# Patient Record
Sex: FEMALE | Race: WHITE | NOT HISPANIC OR LATINO | Employment: FULL TIME | URBAN - NONMETROPOLITAN AREA
[De-identification: names, ages, dates, MRNs, and addresses within clinical notes are randomized per-mention and may not be internally consistent; named-entity substitution may affect disease eponyms.]

---

## 2024-03-13 PROBLEM — R11.0 NAUSEA IN ADULT: Status: ACTIVE | Noted: 2024-03-13

## 2024-03-13 PROBLEM — F31.81 BIPOLAR II DISORDER (MULTI): Status: ACTIVE | Noted: 2022-04-15

## 2024-03-13 PROBLEM — R05.9 COUGH: Status: ACTIVE | Noted: 2024-03-13

## 2024-03-13 PROBLEM — E87.6 HYPOKALEMIA: Status: ACTIVE | Noted: 2024-03-13

## 2024-03-13 PROBLEM — R47.02: Status: ACTIVE | Noted: 2024-03-13

## 2024-03-13 PROBLEM — O14.90 PRE-ECLAMPSIA (HHS-HCC): Status: ACTIVE | Noted: 2017-10-18

## 2024-03-13 PROBLEM — M67.40 GANGLION CYST: Status: ACTIVE | Noted: 2024-03-13

## 2024-03-13 PROBLEM — F31.61 BIPOLAR DISORDER, CURRENT EPISODE MIXED, MILD (MULTI): Status: ACTIVE | Noted: 2020-06-25

## 2024-03-13 PROBLEM — F41.9 ANXIETY: Status: ACTIVE | Noted: 2024-03-13

## 2024-03-13 PROBLEM — F90.9 ATTENTION DEFICIT DISORDER OF ADULT WITH HYPERACTIVITY: Status: ACTIVE | Noted: 2020-11-13

## 2024-03-13 PROBLEM — K52.9 GASTROENTERITIS: Status: ACTIVE | Noted: 2024-03-13

## 2024-03-13 PROBLEM — N89.8 REDUNDANT TISSUE OF HYMENAL RING: Status: ACTIVE | Noted: 2017-03-08

## 2024-03-13 PROBLEM — M72.2 PLANTAR FASCIITIS, RIGHT: Status: ACTIVE | Noted: 2024-03-13

## 2024-03-13 PROBLEM — H92.01 RIGHT EAR PAIN: Status: ACTIVE | Noted: 2024-03-13

## 2024-03-13 PROBLEM — R93.89 ABNORMAL CHEST X-RAY: Status: ACTIVE | Noted: 2024-03-13

## 2024-03-13 PROBLEM — G56.21 ULNAR NEUROPATHY AT ELBOW OF RIGHT UPPER EXTREMITY: Status: ACTIVE | Noted: 2024-03-13

## 2024-03-13 PROBLEM — R06.00 ACUTE DYSPNEA: Status: ACTIVE | Noted: 2024-03-13

## 2024-03-13 PROBLEM — R31.9 HEMATURIA: Status: ACTIVE | Noted: 2024-03-13

## 2024-03-13 PROBLEM — L03.90 CELLULITIS: Status: ACTIVE | Noted: 2024-03-13

## 2024-03-13 PROBLEM — R30.0 DYSURIA: Status: ACTIVE | Noted: 2024-03-13

## 2024-03-13 PROBLEM — M25.579 ANKLE PAIN: Status: ACTIVE | Noted: 2024-03-13

## 2024-03-13 PROBLEM — J02.9 PHARYNGITIS: Status: ACTIVE | Noted: 2024-03-13

## 2024-03-13 PROBLEM — M62.89 MASS OF MUSCLE OF RIGHT UPPER EXTREMITY: Status: ACTIVE | Noted: 2024-03-13

## 2024-03-13 PROBLEM — G56.00 CARPAL TUNNEL SYNDROME: Status: ACTIVE | Noted: 2024-03-13

## 2024-03-13 PROBLEM — D72.820 ATYPICAL LYMPHOCYTOSIS: Status: ACTIVE | Noted: 2024-03-13

## 2024-03-13 PROBLEM — M26.629 TMJ SYNDROME: Status: ACTIVE | Noted: 2024-03-13

## 2024-03-13 PROBLEM — R13.10 DYSPHAGIA: Status: ACTIVE | Noted: 2024-03-13

## 2024-03-13 PROBLEM — E66.9 OBESITY: Status: ACTIVE | Noted: 2024-03-13

## 2024-03-13 PROBLEM — R00.2 PALPITATIONS: Status: ACTIVE | Noted: 2024-03-13

## 2024-03-13 PROBLEM — F41.0 PANIC ATTACK: Status: ACTIVE | Noted: 2024-03-13

## 2024-03-13 PROBLEM — E07.89 THYROID FULLNESS: Status: ACTIVE | Noted: 2024-03-13

## 2024-03-13 PROBLEM — M70.40 BURSITIS, PREPATELLAR: Status: ACTIVE | Noted: 2024-03-13

## 2024-03-13 PROBLEM — R10.10 PAIN OF UPPER ABDOMEN: Status: ACTIVE | Noted: 2024-03-13

## 2024-03-13 PROBLEM — R10.13 DYSPEPSIA: Status: ACTIVE | Noted: 2024-03-13

## 2024-03-13 PROBLEM — R19.7 DIARRHEA: Status: ACTIVE | Noted: 2024-03-13

## 2024-03-13 PROBLEM — M79.671 RIGHT FOOT PAIN: Status: ACTIVE | Noted: 2024-03-13

## 2024-03-13 PROBLEM — K21.9 GERD (GASTROESOPHAGEAL REFLUX DISEASE): Status: ACTIVE | Noted: 2024-03-13

## 2024-03-13 PROBLEM — F32.A DEPRESSION: Status: ACTIVE | Noted: 2024-03-13

## 2024-03-13 PROBLEM — F90.0 ADHD, PREDOMINANTLY INATTENTIVE TYPE: Status: ACTIVE | Noted: 2020-10-15

## 2024-03-13 PROBLEM — H00.019 STYE: Status: ACTIVE | Noted: 2024-03-13

## 2024-03-13 PROBLEM — F41.1 GENERALIZED ANXIETY DISORDER: Status: ACTIVE | Noted: 2020-06-25

## 2024-03-13 PROBLEM — R35.0 FREQUENCY OF URINATION: Status: ACTIVE | Noted: 2024-03-13

## 2024-03-13 PROBLEM — M25.561 RIGHT KNEE PAIN: Status: ACTIVE | Noted: 2024-03-13

## 2024-03-13 PROBLEM — G50.0 TRIGEMINAL NEURALGIA: Status: ACTIVE | Noted: 2024-03-13

## 2025-06-23 NOTE — PROGRESS NOTES
Subjective        Brigitte Damon is a 34 y.o. female who presents for      HPI:    Dr Cruz pt       Chief Complaint   Patient presents with    Weight Loss      Dropped  in year  7 pant sizes  does not know start weight        What concern/ problem/pain/symptom  brings you here today? Lost weight - very stressed so feels that played a part - was eating much less due to her stress     Stress due - marriage issues     One year ago- stress started - initially sstarted to try to lose weight last June and was eating better and working out but then seemed to get out of hand and stopped working out but still losing weight     Still losing weight     Feels tired       Fam history - hemachromatosis      Fam history- none for thyroid       No history cig smoking    Smokes MJ- smokes daily- three times daily- 3 years       how long has pt had sxs?past several months       describe symptoms-  Fever-no  Cough-no  Nausea- yes  Vomiting-occasional  Blood in urine- no  Blood in stool-no  Pain- no      how often do symptoms occur?  daily    has pt tried anything for current symptoms, including medications (OTC or prescription)  ?   thc      what makes symptoms worse?      has pt been seen recently for this problem ( within past 2-3 weeks) ?no    if yes- where?    by who?    what treatment was provided?      Pap and HPV done 2023 - Dr Ibrahim       Wt Readings from Last 5 Encounters:   06/24/25 60.9 kg (134 lb 4.8 oz)   10/05/22 78.1 kg (172 lb 3.2 oz)   05/26/22 78.7 kg (173 lb 9 oz)   07/07/21 74.8 kg (165 lb)   12/07/20 79.4 kg (175 lb)        Daughter- 11 years old           Tobacco Use History[1]      Social History     Substance and Sexual Activity   Alcohol Use Not Currently          Review of Systems:    Review of Systems    Objective        Vitals:    06/24/25 1246   BP: 109/64   BP Location: Left arm   Patient Position: Sitting   BP Cuff Size: Adult   Pulse: 101   Resp: 20   Temp: 36.2 °C (97.2 °F)   TempSrc: Temporal  "  SpO2: 97%   Weight: 60.9 kg (134 lb 4.8 oz)   Height: 1.549 m (5' 1\")       Pt is A and O x3, NAD  Head- normocephalic and atraumatic,   EYES- conjunctiva and lids- normal  EARS/NOSE- TM's normal, nasopharynx- normal and atraumatic  OROPHARYNX- normal  NECK- supple, FROM  THYROID- NT, normal size, no nodule noted  LYMPH- no cervical lymph nodes palpated   BREAST- bilateral breast NT, no nodules, symmetric, no nipple discharge  CV- RRR without murmur  PULM- CTA bilaterally, normal respiratory effort  RESPIRATORY EFFORT- normal , no retractions or nasal flaring   ABD- normoactive BS's , soft , NT, no hepatosplenomegaly palpated  PELVIC-NT, no masses, normal exam  EXT- no edema,NT  SKIN- no abnormal skin lesions noted  NEURO- no focal deficits  PSYCH- pleasant, normal judgement and insight                   BP Readings from Last 3 Encounters:   06/24/25 109/64   10/05/22 146/77   05/26/22 106/73           Wt Readings from Last 3 Encounters:   06/24/25 60.9 kg (134 lb 4.8 oz)   10/05/22 78.1 kg (172 lb 3.2 oz)   05/26/22 78.7 kg (173 lb 9 oz)         BMI Readings from Last 3 Encounters:   06/24/25 25.38 kg/m²   10/05/22 33.63 kg/m²   05/26/22 33.90 kg/m²           Assessment & Plan  Weight loss, unintentional    Orders:    Basic Metabolic Panel; Future    CBC; Future    Hepatic Function Panel; Future    Thyroid Stimulating Hormone; Future    Vitamin B12; Future    Vitamin D 25-Hydroxy,Total (for eval of Vitamin D levels); Future    XR chest 2 views; Future    Other fatigue         Smoker    Orders:    XR chest 2 views; Future    Encounter for screening mammogram for malignant neoplasm of breast    Orders:    BI mammo bilateral screening tomosynthesis; Future             Follow up wioth PCP Dr Cruz                        [1]   Social History  Tobacco Use   Smoking Status Never    Passive exposure: Past   Smokeless Tobacco Never     "

## 2025-06-24 ENCOUNTER — OFFICE VISIT (OUTPATIENT)
Dept: PRIMARY CARE | Facility: CLINIC | Age: 35
End: 2025-06-24
Payer: COMMERCIAL

## 2025-06-24 VITALS
WEIGHT: 134.3 LBS | TEMPERATURE: 97.2 F | BODY MASS INDEX: 25.36 KG/M2 | SYSTOLIC BLOOD PRESSURE: 109 MMHG | HEIGHT: 61 IN | HEART RATE: 101 BPM | DIASTOLIC BLOOD PRESSURE: 64 MMHG | RESPIRATION RATE: 20 BRPM | OXYGEN SATURATION: 97 %

## 2025-06-24 DIAGNOSIS — R63.4 WEIGHT LOSS, UNINTENTIONAL: Primary | ICD-10-CM

## 2025-06-24 DIAGNOSIS — F17.200 SMOKER: ICD-10-CM

## 2025-06-24 DIAGNOSIS — Z12.31 ENCOUNTER FOR SCREENING MAMMOGRAM FOR MALIGNANT NEOPLASM OF BREAST: ICD-10-CM

## 2025-06-24 DIAGNOSIS — R53.83 OTHER FATIGUE: ICD-10-CM

## 2025-06-24 PROCEDURE — 3078F DIAST BP <80 MM HG: CPT | Performed by: FAMILY MEDICINE

## 2025-06-24 PROCEDURE — 99214 OFFICE O/P EST MOD 30 MIN: CPT | Performed by: FAMILY MEDICINE

## 2025-06-24 PROCEDURE — 3074F SYST BP LT 130 MM HG: CPT | Performed by: FAMILY MEDICINE

## 2025-06-24 PROCEDURE — 1036F TOBACCO NON-USER: CPT | Performed by: FAMILY MEDICINE

## 2025-06-24 PROCEDURE — 3008F BODY MASS INDEX DOCD: CPT | Performed by: FAMILY MEDICINE

## 2025-06-25 ENCOUNTER — HOSPITAL ENCOUNTER (OUTPATIENT)
Dept: RADIOLOGY | Facility: CLINIC | Age: 35
Discharge: HOME | End: 2025-06-25
Payer: COMMERCIAL

## 2025-06-25 DIAGNOSIS — R63.4 WEIGHT LOSS, UNINTENTIONAL: ICD-10-CM

## 2025-06-25 DIAGNOSIS — Z12.31 ENCOUNTER FOR SCREENING MAMMOGRAM FOR MALIGNANT NEOPLASM OF BREAST: ICD-10-CM

## 2025-06-25 DIAGNOSIS — F17.200 SMOKER: ICD-10-CM

## 2025-06-25 LAB
25(OH)D3+25(OH)D2 SERPL-MCNC: 28 NG/ML (ref 30–100)
ALBUMIN SERPL-MCNC: 4.6 G/DL (ref 3.6–5.1)
ALBUMIN/GLOB SERPL: 2.3 (CALC) (ref 1–2.5)
ALP SERPL-CCNC: 35 U/L (ref 31–125)
ALT SERPL-CCNC: 11 U/L (ref 6–29)
ANION GAP SERPL CALCULATED.4IONS-SCNC: 11 MMOL/L (CALC) (ref 7–17)
AST SERPL-CCNC: 12 U/L (ref 10–30)
BILIRUB DIRECT SERPL-MCNC: 0.1 MG/DL
BILIRUB INDIRECT SERPL-MCNC: 0.4 MG/DL (CALC) (ref 0.2–1.2)
BILIRUB SERPL-MCNC: 0.5 MG/DL (ref 0.2–1.2)
BUN SERPL-MCNC: 13 MG/DL (ref 7–25)
BUN/CREAT SERPL: ABNORMAL (CALC) (ref 6–22)
CALCIUM SERPL-MCNC: 9.3 MG/DL (ref 8.6–10.2)
CHLORIDE SERPL-SCNC: 108 MMOL/L (ref 98–110)
CO2 SERPL-SCNC: 19 MMOL/L (ref 20–32)
CREAT SERPL-MCNC: 0.73 MG/DL (ref 0.5–0.97)
EGFRCR SERPLBLD CKD-EPI 2021: 109 ML/MIN/1.73M2
ERYTHROCYTE [DISTWIDTH] IN BLOOD BY AUTOMATED COUNT: 12.8 % (ref 11–15)
GLOBULIN SER CALC-MCNC: 2 G/DL (CALC) (ref 1.9–3.7)
GLUCOSE SERPL-MCNC: 84 MG/DL (ref 65–99)
HCT VFR BLD AUTO: 40.7 % (ref 35–45)
HGB BLD-MCNC: 12.9 G/DL (ref 11.7–15.5)
MCH RBC QN AUTO: 29.8 PG (ref 27–33)
MCHC RBC AUTO-ENTMCNC: 31.7 G/DL (ref 32–36)
MCV RBC AUTO: 94 FL (ref 80–100)
PLATELET # BLD AUTO: 277 THOUSAND/UL (ref 140–400)
PMV BLD REES-ECKER: 10.9 FL (ref 7.5–12.5)
POTASSIUM SERPL-SCNC: 4.3 MMOL/L (ref 3.5–5.3)
PROT SERPL-MCNC: 6.6 G/DL (ref 6.1–8.1)
RBC # BLD AUTO: 4.33 MILLION/UL (ref 3.8–5.1)
SODIUM SERPL-SCNC: 138 MMOL/L (ref 135–146)
TSH SERPL-ACNC: 0.52 MIU/L
VIT B12 SERPL-MCNC: 476 PG/ML (ref 200–1100)
WBC # BLD AUTO: 9.7 THOUSAND/UL (ref 3.8–10.8)

## 2025-06-25 PROCEDURE — 77067 SCR MAMMO BI INCL CAD: CPT | Performed by: RADIOLOGY

## 2025-06-25 PROCEDURE — 71046 X-RAY EXAM CHEST 2 VIEWS: CPT | Performed by: RADIOLOGY

## 2025-06-25 PROCEDURE — 71046 X-RAY EXAM CHEST 2 VIEWS: CPT

## 2025-06-25 PROCEDURE — 77063 BREAST TOMOSYNTHESIS BI: CPT | Performed by: RADIOLOGY

## 2025-06-25 PROCEDURE — 77067 SCR MAMMO BI INCL CAD: CPT

## 2025-07-01 DIAGNOSIS — R63.4 WEIGHT LOSS, UNINTENTIONAL: ICD-10-CM

## 2025-08-05 ENCOUNTER — APPOINTMENT (OUTPATIENT)
Dept: PRIMARY CARE | Facility: CLINIC | Age: 35
End: 2025-08-05
Payer: COMMERCIAL

## 2025-08-05 VITALS
WEIGHT: 139.3 LBS | OXYGEN SATURATION: 98 % | TEMPERATURE: 97.3 F | HEART RATE: 91 BPM | DIASTOLIC BLOOD PRESSURE: 69 MMHG | HEIGHT: 61 IN | BODY MASS INDEX: 26.3 KG/M2 | SYSTOLIC BLOOD PRESSURE: 127 MMHG

## 2025-08-05 DIAGNOSIS — M25.50 ARTHRALGIA OF MULTIPLE SITES: ICD-10-CM

## 2025-08-05 DIAGNOSIS — E55.9 VITAMIN D DEFICIENCY: Primary | ICD-10-CM

## 2025-08-05 DIAGNOSIS — E83.19 IRON EXCESS: ICD-10-CM

## 2025-08-05 PROBLEM — R05.9 COUGH: Status: RESOLVED | Noted: 2024-03-13 | Resolved: 2025-08-05

## 2025-08-05 PROBLEM — E66.9 OBESITY: Status: RESOLVED | Noted: 2024-03-13 | Resolved: 2025-08-05

## 2025-08-05 PROCEDURE — 3074F SYST BP LT 130 MM HG: CPT | Performed by: FAMILY MEDICINE

## 2025-08-05 PROCEDURE — 3078F DIAST BP <80 MM HG: CPT | Performed by: FAMILY MEDICINE

## 2025-08-05 PROCEDURE — 3008F BODY MASS INDEX DOCD: CPT | Performed by: FAMILY MEDICINE

## 2025-08-05 PROCEDURE — 1036F TOBACCO NON-USER: CPT | Performed by: FAMILY MEDICINE

## 2025-08-05 PROCEDURE — 99214 OFFICE O/P EST MOD 30 MIN: CPT | Performed by: FAMILY MEDICINE

## 2025-08-05 RX ORDER — SPIRONOLACTONE 100 MG/1
100 TABLET, FILM COATED ORAL DAILY
COMMUNITY
Start: 2025-07-20

## 2025-08-05 RX ORDER — CLINDAMYCIN PHOSPHATE 10 UG/ML
LOTION TOPICAL 2 TIMES DAILY
COMMUNITY

## 2025-08-05 RX ORDER — VIT C/E/ZN/COPPR/LUTEIN/ZEAXAN 250MG-90MG
125 CAPSULE ORAL DAILY
Qty: 30 CAPSULE | Refills: 11 | Status: SHIPPED | OUTPATIENT
Start: 2025-08-05 | End: 2026-08-05

## 2025-08-05 RX ORDER — MELOXICAM 7.5 MG/1
7.5 TABLET ORAL DAILY
Qty: 30 TABLET | Refills: 0 | Status: SHIPPED | OUTPATIENT
Start: 2025-08-05 | End: 2025-09-05

## 2025-08-05 NOTE — PATIENT INSTRUCTIONS
VISIT SUMMARY:  Today, you had a follow-up appointment to discuss your weight loss and vitamin D deficiency. We reviewed your recent lab results and discussed your ongoing health concerns, including your bone health, chronic pain, anxiety, and iron levels.    YOUR PLAN:  -VITAMIN D DEFICIENCY: Vitamin D deficiency means your body has lower than normal levels of vitamin D, which is important for bone health. We are increasing your vitamin D supplement to 5000 IU daily and will check your levels again in the fall before winter.    -DEGENERATIVE ARTHRITIS OF THE SPINE WITH CHRONIC RIGHT KNEE AND BILATERAL ANKLE PAIN: Degenerative arthritis is a condition where the cartilage in your joints wears down over time, causing pain and stiffness. We are prescribing meloxicam 7.5 mg to be taken as needed for pain, with food. We will also check your blood for signs of inflammation and encourage you to do weight-bearing exercises like walking and weightlifting.    -GENERALIZED ANXIETY DISORDER: Generalized anxiety disorder is a condition characterized by excessive worry and anxiety. You are managing your anxiety with medicinal-grade cannabis, which has been effective for you.    -HISTORY OF IRON OVERLOAD (MONITOR FOR HEMOCHROMATOSIS): Iron overload means having higher than normal levels of iron in your body, which can be harmful. We will monitor your iron levels with your next set of labs to ensure they remain in a safe range.    INSTRUCTIONS:  Please follow up with the recommended lab tests, including vitamin D levels, C-reactive protein, sed rate, and iron studies. Continue taking your medications as prescribed and engage in weight-bearing exercises. We will reassess your condition in the fall.

## 2025-08-05 NOTE — PROGRESS NOTES
Subjective   Patient ID: Brigitte Damon is a 34 y.o. female who presents for Follow-up (6-8wk fuv medications, no issues or complaints).  History of Present Illness  Brigitte Damon is a 34 year old female who presents for follow-up on weight loss and vitamin D deficiency.    She has experienced significant weight loss, which she feels exceeds what is typically expected from stress. A comprehensive lab workup in June revealed low vitamin D levels, while other parameters such as electrolytes, blood sugar, thyroid function, vitamin B12, and hemoglobin were normal. She is currently taking vitamin D supplements at a dose of 2000 IU daily.    She has a history of low vitamin D and is concerned about her bone health due to her mother's history of severe osteoporosis. 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 She has previously found relief with meloxicam.    She is currently taking spironolactone for hidradenitis suppurativa, which she reports is effective. She also uses liquid minoxidil, although she does not notice a significant difference from it. She experiences hair loss, which she attributes to stress and possibly inadequate protein intake. She has a history of high iron levels, which fluctuate, and she is concerned about its potential impact on her health.    She has a significant history of anxiety and has tried various medications in the past with poor results. She currently manages her anxiety with medicinal-grade cannabis, which she finds effective in preventing vomiting and weight gain. She is cautious about using non-medical sources for cannabis due to concerns about safety and quality.    She reports a history of gastrointestinal issues, including frequent vomiting episodes in the past, which have improved with the use of cannabis. No current constipation or family  "history of early bowel disease. She is mindful of her diet, consuming fresh fruits and vegetables regularly.    Review of Systems    Objective     /69 (BP Location: Left arm, Patient Position: Sitting, BP Cuff Size: Adult)   Pulse 91   Temp 36.3 °C (97.3 °F) (Temporal)   Ht (!) 1.549 m (5' 1\")   Wt 63.2 kg (139 lb 4.8 oz)   LMP 07/06/2025 (Approximate)   SpO2 98%   BMI 26.32 kg/m²      Physical Exam  GENERAL: Alert, cooperative, well developed, no acute distress.  HEENT: Normocephalic, normal oropharynx, moist mucous membranes.  NECK: Thyroid palpable.  CHEST: Clear to auscultation bilaterally, no wheezes, rhonchi, or crackles.  CARDIOVASCULAR: Normal heart rate and rhythm, S1 and S2 normal without murmurs.  ABDOMEN: Soft, non-tender, non-distended, without organomegaly, normal bowel sounds, liver and spleen non-tender to palpation.  EXTREMITIES: No cyanosis or edema.  NEUROLOGICAL: Cranial nerves grossly intact, moves all extremities without gross motor or sensory deficit.    Assessment & Plan  Vitamin D deficiency  Vitamin D deficiency with a family history of severe osteoporosis in her mother, necessitating attention to bone health. Current supplementation is 2000 IU daily.  - Increase vitamin D supplementation to 5000 IU daily.  - Repeat vitamin D level in the fall before winter.    Degenerative arthritis of the spine with chronic right knee and bilateral ankle pain  Chronic degenerative arthritis with discogenic changes in the spine and chronic pain in the right knee and bilateral ankles. Family history of severe arthritis. Previous use of meloxicam provided significant relief.  - Prescribe meloxicam 7.5 mg for use as needed for arthritic pain, with food.  - Order C-reactive protein and sed rate with next set of labs to assess for inflammatory arthritis.  - Encourage weight-bearing exercises such as walking and weightlifting.    Generalized anxiety disorder  Severe anxiety managed with " medicinal-grade cannabis due to adverse experiences with traditional anxiety medications.    History of iron overload (monitor for hemochromatosis)  Fluctuating iron levels with previous high levels and a family history of hemochromatosis. Genetic marker for hemochromatosis identified through [employer] testing.  - Order iron studies with next set of labs to monitor iron levels.    Shawn Cruz MD     This medical note was created with the assistance of artificial intelligence (AI) for documentation purposes. The content has been reviewed and confirmed by the healthcare provider for accuracy and completeness. Patient consented to the use of audio recording and use of AI during their visit.

## 2025-08-12 ENCOUNTER — RESULTS FOLLOW-UP (OUTPATIENT)
Dept: PRIMARY CARE | Facility: CLINIC | Age: 35
End: 2025-08-12
Payer: COMMERCIAL

## 2025-08-12 DIAGNOSIS — E55.9 VITAMIN D DEFICIENCY: Primary | ICD-10-CM

## 2025-08-12 RX ORDER — ERGOCALCIFEROL 1.25 MG/1
1.25 CAPSULE ORAL WEEKLY
Qty: 4 CAPSULE | Refills: 6 | Status: SHIPPED | OUTPATIENT
Start: 2025-08-12 | End: 2026-02-08

## 2026-01-30 ENCOUNTER — APPOINTMENT (OUTPATIENT)
Dept: PRIMARY CARE | Facility: CLINIC | Age: 36
End: 2026-01-30
Payer: COMMERCIAL